# Patient Record
Sex: MALE | Race: WHITE
[De-identification: names, ages, dates, MRNs, and addresses within clinical notes are randomized per-mention and may not be internally consistent; named-entity substitution may affect disease eponyms.]

---

## 2018-04-21 ENCOUNTER — HOSPITAL ENCOUNTER (EMERGENCY)
Dept: HOSPITAL 92 - ERS | Age: 63
Discharge: LEFT BEFORE DISCHARGE | End: 2018-04-21
Payer: MEDICARE

## 2018-04-21 DIAGNOSIS — I11.0: ICD-10-CM

## 2018-04-21 DIAGNOSIS — R09.02: ICD-10-CM

## 2018-04-21 DIAGNOSIS — F31.9: ICD-10-CM

## 2018-04-21 DIAGNOSIS — N17.9: ICD-10-CM

## 2018-04-21 DIAGNOSIS — K74.60: ICD-10-CM

## 2018-04-21 DIAGNOSIS — E11.9: ICD-10-CM

## 2018-04-21 DIAGNOSIS — E78.5: ICD-10-CM

## 2018-04-21 DIAGNOSIS — I25.2: ICD-10-CM

## 2018-04-21 DIAGNOSIS — I50.9: ICD-10-CM

## 2018-04-21 DIAGNOSIS — F41.9: ICD-10-CM

## 2018-04-21 DIAGNOSIS — Z87.891: ICD-10-CM

## 2018-04-21 DIAGNOSIS — D64.9: ICD-10-CM

## 2018-04-21 DIAGNOSIS — F43.10: ICD-10-CM

## 2018-04-21 DIAGNOSIS — I21.4: Primary | ICD-10-CM

## 2018-04-21 LAB
ALBUMIN SERPL BCG-MCNC: 3.8 G/DL (ref 3.4–4.8)
ALP SERPL-CCNC: 68 U/L (ref 40–150)
ALT SERPL W P-5'-P-CCNC: 8 U/L (ref 8–55)
ANION GAP SERPL CALC-SCNC: 13 MMOL/L (ref 10–20)
AST SERPL-CCNC: 15 U/L (ref 5–34)
BILIRUB SERPL-MCNC: 0.5 MG/DL (ref 0.2–1.2)
BUN SERPL-MCNC: 44 MG/DL (ref 8.4–25.7)
CALCIUM SERPL-MCNC: 8.9 MG/DL (ref 7.8–10.44)
CHLORIDE SERPL-SCNC: 114 MMOL/L (ref 98–107)
CK MB SERPL-MCNC: 3 NG/ML (ref 0–6.6)
CK SERPL-CCNC: 46 U/L (ref 30–200)
CO2 SERPL-SCNC: 18 MMOL/L (ref 23–31)
CREAT CL PREDICTED SERPL C-G-VRATE: 0 ML/MIN (ref 70–130)
GLOBULIN SER CALC-MCNC: 3.1 G/DL (ref 2.4–3.5)
GLUCOSE SERPL-MCNC: 170 MG/DL (ref 80–115)
HGB BLD-MCNC: 8.1 G/DL (ref 14–18)
MCH RBC QN AUTO: 30.9 PG (ref 27–31)
MCV RBC AUTO: 95.4 FL (ref 80–94)
MDIFF COMPLETE?: YES
PLATELET # BLD AUTO: 38 THOU/UL (ref 130–400)
PLATELET BLD QL SMEAR: (no result)
POTASSIUM SERPL-SCNC: 3.9 MMOL/L (ref 3.5–5.1)
RBC # BLD AUTO: 2.61 MILL/UL (ref 4.7–6.1)
SODIUM SERPL-SCNC: 141 MMOL/L (ref 136–145)
TROPONIN I SERPL DL<=0.01 NG/ML-MCNC: 1.38 NG/ML (ref ?–0.03)
WBC # BLD AUTO: 2.9 THOU/UL (ref 4.8–10.8)

## 2018-04-21 PROCEDURE — 80053 COMPREHEN METABOLIC PANEL: CPT

## 2018-04-21 PROCEDURE — 84484 ASSAY OF TROPONIN QUANT: CPT

## 2018-04-21 PROCEDURE — 94760 N-INVAS EAR/PLS OXIMETRY 1: CPT

## 2018-04-21 PROCEDURE — 93005 ELECTROCARDIOGRAM TRACING: CPT

## 2018-04-21 PROCEDURE — 71045 X-RAY EXAM CHEST 1 VIEW: CPT

## 2018-04-21 PROCEDURE — 85025 COMPLETE CBC W/AUTO DIFF WBC: CPT

## 2018-04-21 PROCEDURE — 82553 CREATINE MB FRACTION: CPT

## 2018-04-21 NOTE — PDOC.FPRHP
- History of Present Illness


Chief Complaint: SOB


History of Present Illness: 





64 yo male presents with SOB.


ED Course: 





Rocephin, Azithromycin, 500 ml NS bolus, Aspirin





- Allergies/Adverse Reactions


 Allergies











Allergy/AdvReac Type Severity Reaction Status Date / Time


 


No Known Allergies Allergy   Verified 05/28/14 13:00














- Home Medications


 











 Medication  Instructions  Recorded  Confirmed  Type


 


Lisinopril [Prinivil] 10 mg PO DAILY 05/28/14 09/07/14 History


 


Aspirin 325 mg PO DAILY #0 tab 06/04/14 09/08/14 Rx


 


Clopidogrel Bisulfate [Plavix] 75 mg PO DAILY #0 tab 06/04/14 09/07/14 Rx


 


Acarbose [Precose] 50 mg PO TID-WM 06/07/14 09/07/14 History


 


Carvedilol [Coreg] 12.5 mg PO BID #0 tab 06/09/14 09/07/14 Rx


 


Furosemide [Lasix] 40 mg PO 0900,1400 #0 tab 06/09/14 09/07/14 Rx


 


HYDROcodone Bit/Acetaminophen 1 - 2 tab PO Q8HR PRN #0 tab 06/09/14 09/08/14 Rx





[Vicodin]    


 


Nitroglycerin [Nitrostat] 0.4 mg PO Q5MIN PRN #0 tab 06/09/14 09/07/14 Rx


 


Atorvastatin Calcium [Lipitor] 40 mg PO HS 09/07/14 09/07/14 History


 


traMADol HCl [Tramadol HCl] 50 mg PO TID PRN 09/07/14 09/07/14 History


 


HYDROcodone Bit/APAP 5/325 [Norco] 1 tab PO Q4H PRN #0 tab 09/21/14  Rx


 


Potassium Chloride [K-Dur] 20 meq PO QAM-WM #0 tab 09/21/14  Rx














- History


PMHx: DM2, HLD, HTN, Anemia, CHF, CKD, Cirrhosis of Liver


 


PSHx: Unsuccessful stent placement in 2014





FHx: Non- Contributory 


 


Social: Former Tobacco and Alcohol user. No illicit drug use. 


 








- Review of Systems


Respiratory: reports: shortness of breath





- Vital signs


BP: [134/69]  HR: [75] RR: [32] Tmax: [97.4] Pox: [97]% on [1L]  Wt: [89.4kg ] 

  








FMR H&P: Results





- Labs


Result Diagrams: 


 04/21/18 20:31





 04/21/18 20:31


Lab results: 


 











WBC  2.9 thou/uL (4.8-10.8)  L  04/21/18  20:31    


 


Hgb  8.1 g/dL (14.0-18.0)  L  04/21/18  20:31    


 


Hct  24.9 % (42.0-52.0)  L  04/21/18  20:31    


 


MCV  95.4 fl (80.0-94.0)  H  04/21/18  20:31    


 


Plt Count  38 thou/uL (130-400)  L  04/21/18  20:31    


 


Sodium  141 mmol/L (136-145)   04/21/18  20:31    


 


Potassium  3.9 mmol/L (3.5-5.1)   04/21/18  20:31    


 


Chloride  114 mmol/L ()  H  04/21/18  20:31    


 


Carbon Dioxide  18 mmol/L (23-31)  L  04/21/18  20:31    


 


BUN  44 mg/dL (8.4-25.7)  H  04/21/18  20:31    


 


Creatinine  1.78 mg/dL (0.6-1.3)  H  04/21/18  20:31    


 


Glucose  170 mg/dL ()  H  04/21/18  20:31    


 


Calcium  8.9 mg/dL (7.8-10.44)   04/21/18  20:31    


 


Total Bilirubin  0.5 mg/dL (0.2-1.2)   04/21/18  20:31    


 


AST  15 U/L (5-34)   04/21/18  20:31    


 


ALT  8 U/L (8-55)   04/21/18  20:31    


 


Alkaline Phosphatase  68 U/L ()   04/21/18  20:31    


 


Creatine Kinase  46 U/L ()   04/21/18  20:31    


 


CK-MB (CK-2)  3.0 ng/mL (0-6.6)   04/21/18  20:31    


 


Serum Total Protein  6.9 g/dL (5.8-8.1)   04/21/18  20:31    


 


Albumin  3.8 g/dL (3.4-4.8)   04/21/18  20:31    














FMR H&P: Upper Level





- Plan


Date/Time: 04/21/18 2238








I, [], have evaluated this patient and agree with findings/plan as outlined by 

intern resident. Pertinent changes/additions are listed here.

## 2018-04-21 NOTE — RAD
PORTABLE AP CHEST X-RAY:

4/21/18

 

HISTORY: 

Shortness of breath. 

 

COMPARISON:  

Prior study on 9/19/14.

 

FINDINGS:  

Right subclavian central venous catheter is no longer in place. Postsurgical change related to median
 sternotomy are noted. There is increased air space opacity seen at the right lung base worrisome for
 either aspiration pneumonitis or pneumonia. Left lung is clear. No other interval change. 

 

IMPRESSION:  

1.      Parenchymal air space opacity at the right lung base which may be related to either aspiratio
n pneumonitis or pneumonia. Followup to resolution is recommended. 

2.      Cardiomegaly.

 

POS: [facility]

## 2018-05-23 ENCOUNTER — HOSPITAL ENCOUNTER (EMERGENCY)
Dept: HOSPITAL 92 - ERS | Age: 63
Discharge: HOME | End: 2018-05-23
Payer: MEDICARE

## 2018-05-23 DIAGNOSIS — I25.2: ICD-10-CM

## 2018-05-23 DIAGNOSIS — E11.9: ICD-10-CM

## 2018-05-23 DIAGNOSIS — Z79.899: ICD-10-CM

## 2018-05-23 DIAGNOSIS — F31.9: ICD-10-CM

## 2018-05-23 DIAGNOSIS — F41.9: ICD-10-CM

## 2018-05-23 DIAGNOSIS — I11.0: ICD-10-CM

## 2018-05-23 DIAGNOSIS — I50.9: ICD-10-CM

## 2018-05-23 DIAGNOSIS — E78.5: ICD-10-CM

## 2018-05-23 DIAGNOSIS — Z87.891: ICD-10-CM

## 2018-05-23 DIAGNOSIS — L03.113: Primary | ICD-10-CM

## 2018-05-23 LAB
ALBUMIN SERPL BCG-MCNC: 3.8 G/DL (ref 3.4–4.8)
ALP SERPL-CCNC: 72 U/L (ref 40–150)
ALT SERPL W P-5'-P-CCNC: 8 U/L (ref 8–55)
ANION GAP SERPL CALC-SCNC: 15 MMOL/L (ref 10–20)
AST SERPL-CCNC: 20 U/L (ref 5–34)
BILIRUB SERPL-MCNC: 0.6 MG/DL (ref 0.2–1.2)
BUN SERPL-MCNC: 32 MG/DL (ref 8.4–25.7)
CALCIUM SERPL-MCNC: 8.7 MG/DL (ref 7.8–10.44)
CHLORIDE SERPL-SCNC: 108 MMOL/L (ref 98–107)
CK MB SERPL-MCNC: 1.3 NG/ML (ref 0–6.6)
CK SERPL-CCNC: 31 U/L (ref 30–200)
CO2 SERPL-SCNC: 20 MMOL/L (ref 23–31)
CREAT CL PREDICTED SERPL C-G-VRATE: 0 ML/MIN (ref 70–130)
GLOBULIN SER CALC-MCNC: 3.6 G/DL (ref 2.4–3.5)
GLUCOSE SERPL-MCNC: 135 MG/DL (ref 80–115)
HGB BLD-MCNC: 8.9 G/DL (ref 14–18)
MCH RBC QN AUTO: 30.6 PG (ref 27–31)
MCV RBC AUTO: 94.2 FL (ref 80–94)
MDIFF COMPLETE?: YES
PLATELET # BLD AUTO: 53 THOU/UL (ref 130–400)
PLATELET BLD QL SMEAR: (no result)
POTASSIUM SERPL-SCNC: 3.5 MMOL/L (ref 3.5–5.1)
RBC # BLD AUTO: 2.91 MILL/UL (ref 4.7–6.1)
SODIUM SERPL-SCNC: 139 MMOL/L (ref 136–145)
TROPONIN I SERPL DL<=0.01 NG/ML-MCNC: 0.05 NG/ML (ref ?–0.03)
WBC # BLD AUTO: 2.7 THOU/UL (ref 4.8–10.8)

## 2018-05-23 PROCEDURE — 82550 ASSAY OF CK (CPK): CPT

## 2018-05-23 PROCEDURE — 93005 ELECTROCARDIOGRAM TRACING: CPT

## 2018-05-23 PROCEDURE — 82553 CREATINE MB FRACTION: CPT

## 2018-05-23 PROCEDURE — 36415 COLL VENOUS BLD VENIPUNCTURE: CPT

## 2018-05-23 PROCEDURE — 80053 COMPREHEN METABOLIC PANEL: CPT

## 2018-05-23 PROCEDURE — 84484 ASSAY OF TROPONIN QUANT: CPT

## 2018-05-23 PROCEDURE — 85025 COMPLETE CBC W/AUTO DIFF WBC: CPT

## 2018-05-23 NOTE — ULT
RIGHT UPPER EXTREMITY VENOUS DOPPLER:

 

05/23/2018

 

PROVIDED CLINICAL HISTORY:

Right arm swelling.

 

FINDINGS:

Gray-scale and color Doppler sonography with spectral analysis was performed of the right internal ju
gular, subclavian, axillary, brachial, basilic, cephalic, radial, and ulnar veins, demonstrating a no
rmal sonographic appearance to each.

 

IMPRESSION:

No sonographic evidence for right upper extremity venous thrombosis.

 

POS: [facility]

## 2018-07-07 ENCOUNTER — HOSPITAL ENCOUNTER (EMERGENCY)
Dept: HOSPITAL 92 - ERS | Age: 63
Discharge: HOME | End: 2018-07-07
Payer: OTHER GOVERNMENT

## 2018-07-07 DIAGNOSIS — F43.10: ICD-10-CM

## 2018-07-07 DIAGNOSIS — E78.5: ICD-10-CM

## 2018-07-07 DIAGNOSIS — Z79.891: ICD-10-CM

## 2018-07-07 DIAGNOSIS — Z87.891: ICD-10-CM

## 2018-07-07 DIAGNOSIS — Z79.899: ICD-10-CM

## 2018-07-07 DIAGNOSIS — I11.0: Primary | ICD-10-CM

## 2018-07-07 DIAGNOSIS — F41.9: ICD-10-CM

## 2018-07-07 DIAGNOSIS — E11.9: ICD-10-CM

## 2018-07-07 DIAGNOSIS — F31.9: ICD-10-CM

## 2018-07-07 DIAGNOSIS — I25.2: ICD-10-CM

## 2018-07-07 DIAGNOSIS — I50.9: ICD-10-CM

## 2018-07-07 DIAGNOSIS — D64.9: ICD-10-CM

## 2018-07-07 LAB
ALBUMIN SERPL BCG-MCNC: 3.6 G/DL (ref 3.4–4.8)
ALP SERPL-CCNC: 76 U/L (ref 40–150)
ALT SERPL W P-5'-P-CCNC: 12 U/L (ref 8–55)
ANION GAP SERPL CALC-SCNC: 13 MMOL/L (ref 10–20)
AST SERPL-CCNC: 19 U/L (ref 5–34)
BILIRUB SERPL-MCNC: 0.5 MG/DL (ref 0.2–1.2)
BUN SERPL-MCNC: 39 MG/DL (ref 8.4–25.7)
CALCIUM SERPL-MCNC: 8.3 MG/DL (ref 7.8–10.44)
CHLORIDE SERPL-SCNC: 111 MMOL/L (ref 98–107)
CK MB SERPL-MCNC: 3.4 NG/ML (ref 0–6.6)
CO2 SERPL-SCNC: 20 MMOL/L (ref 23–31)
CREAT CL PREDICTED SERPL C-G-VRATE: 0 ML/MIN (ref 70–130)
GLOBULIN SER CALC-MCNC: 2.9 G/DL (ref 2.4–3.5)
GLUCOSE SERPL-MCNC: 140 MG/DL (ref 80–115)
HGB BLD-MCNC: 7.9 G/DL (ref 14–18)
MCH RBC QN AUTO: 31.6 PG (ref 27–31)
MCV RBC AUTO: 96.1 FL (ref 78–98)
MDIFF COMPLETE?: YES
PLATELET # BLD AUTO: 35 THOU/UL (ref 130–400)
PLATELET BLD QL SMEAR: (no result)
POTASSIUM SERPL-SCNC: 4.5 MMOL/L (ref 3.5–5.1)
RBC # BLD AUTO: 2.51 MILL/UL (ref 4.7–6.1)
SODIUM SERPL-SCNC: 139 MMOL/L (ref 136–145)
TROPONIN I SERPL DL<=0.01 NG/ML-MCNC: 0.09 NG/ML (ref ?–0.03)
WBC # BLD AUTO: 2.6 THOU/UL (ref 4.8–10.8)

## 2018-07-07 PROCEDURE — 82553 CREATINE MB FRACTION: CPT

## 2018-07-07 PROCEDURE — 80053 COMPREHEN METABOLIC PANEL: CPT

## 2018-07-07 PROCEDURE — 93005 ELECTROCARDIOGRAM TRACING: CPT

## 2018-07-07 PROCEDURE — 85025 COMPLETE CBC W/AUTO DIFF WBC: CPT

## 2018-07-07 PROCEDURE — 96374 THER/PROPH/DIAG INJ IV PUSH: CPT

## 2018-07-07 PROCEDURE — 36415 COLL VENOUS BLD VENIPUNCTURE: CPT

## 2018-07-07 PROCEDURE — 84484 ASSAY OF TROPONIN QUANT: CPT

## 2018-07-07 PROCEDURE — 71045 X-RAY EXAM CHEST 1 VIEW: CPT

## 2018-07-07 PROCEDURE — 83880 ASSAY OF NATRIURETIC PEPTIDE: CPT

## 2018-07-07 NOTE — RAD
FRONTAL VIEW CHEST:

 

COMPARISON: 

4/21/18.

 

INDICATION: 

Short of breath.

 

FINDINGS: 

Progressive pleural and parenchymal density at the mid to inferior right chest is present.  The left 
lung is relatively clear.  Cardiac silhouette and pulmonary vasculature are prominent with evidence o
f prior sternotomy.

 

IMPRESSION: 

Progressive pleural and parenchymal density of the mid inferior right hemithorax.  This may relate to
 pneumonia with associated pleural effusion.  Recommend continued imaging followup with dedicated 2-v
iew chest for further assessment.

 

POS: [facility]

## 2018-07-23 ENCOUNTER — HOSPITAL ENCOUNTER (INPATIENT)
Dept: HOSPITAL 92 - ERS | Age: 63
LOS: 2 days | Discharge: LEFT BEFORE DISCHARGE | DRG: 189 | End: 2018-07-25
Attending: HOSPITALIST | Admitting: HOSPITALIST
Payer: OTHER GOVERNMENT

## 2018-07-23 VITALS — BODY MASS INDEX: 25.2 KG/M2

## 2018-07-23 DIAGNOSIS — E11.22: ICD-10-CM

## 2018-07-23 DIAGNOSIS — I12.9: ICD-10-CM

## 2018-07-23 DIAGNOSIS — N18.3: ICD-10-CM

## 2018-07-23 DIAGNOSIS — J90: ICD-10-CM

## 2018-07-23 DIAGNOSIS — Z79.02: ICD-10-CM

## 2018-07-23 DIAGNOSIS — Z79.82: ICD-10-CM

## 2018-07-23 DIAGNOSIS — I25.10: ICD-10-CM

## 2018-07-23 DIAGNOSIS — Z82.49: ICD-10-CM

## 2018-07-23 DIAGNOSIS — Z87.891: ICD-10-CM

## 2018-07-23 DIAGNOSIS — Z79.4: ICD-10-CM

## 2018-07-23 DIAGNOSIS — J96.01: Primary | ICD-10-CM

## 2018-07-23 DIAGNOSIS — K74.60: ICD-10-CM

## 2018-07-23 DIAGNOSIS — D61.818: ICD-10-CM

## 2018-07-23 DIAGNOSIS — Z95.1: ICD-10-CM

## 2018-07-23 DIAGNOSIS — I42.9: ICD-10-CM

## 2018-07-23 DIAGNOSIS — R18.8: ICD-10-CM

## 2018-07-23 PROCEDURE — 93306 TTE W/DOPPLER COMPLETE: CPT

## 2018-07-23 PROCEDURE — 85610 PROTHROMBIN TIME: CPT

## 2018-07-23 PROCEDURE — 71045 X-RAY EXAM CHEST 1 VIEW: CPT

## 2018-07-23 PROCEDURE — 36415 COLL VENOUS BLD VENIPUNCTURE: CPT

## 2018-07-23 PROCEDURE — 86850 RBC ANTIBODY SCREEN: CPT

## 2018-07-23 PROCEDURE — 80076 HEPATIC FUNCTION PANEL: CPT

## 2018-07-23 PROCEDURE — 80053 COMPREHEN METABOLIC PANEL: CPT

## 2018-07-23 PROCEDURE — 36416 COLLJ CAPILLARY BLOOD SPEC: CPT

## 2018-07-23 PROCEDURE — 83880 ASSAY OF NATRIURETIC PEPTIDE: CPT

## 2018-07-23 PROCEDURE — 86900 BLOOD TYPING SEROLOGIC ABO: CPT

## 2018-07-23 PROCEDURE — 85025 COMPLETE CBC W/AUTO DIFF WBC: CPT

## 2018-07-23 PROCEDURE — 82553 CREATINE MB FRACTION: CPT

## 2018-07-23 PROCEDURE — 84484 ASSAY OF TROPONIN QUANT: CPT

## 2018-07-23 PROCEDURE — 81003 URINALYSIS AUTO W/O SCOPE: CPT

## 2018-07-23 PROCEDURE — 82140 ASSAY OF AMMONIA: CPT

## 2018-07-23 PROCEDURE — 80048 BASIC METABOLIC PNL TOTAL CA: CPT

## 2018-07-23 PROCEDURE — 96374 THER/PROPH/DIAG INJ IV PUSH: CPT

## 2018-07-23 PROCEDURE — 86901 BLOOD TYPING SEROLOGIC RH(D): CPT

## 2018-07-24 LAB
ALBUMIN SERPL BCG-MCNC: 3.7 G/DL (ref 3.4–4.8)
ALP SERPL-CCNC: 64 U/L (ref 40–150)
ALT SERPL W P-5'-P-CCNC: (no result) U/L (ref 8–55)
ANION GAP SERPL CALC-SCNC: 14 MMOL/L (ref 10–20)
ANISOCYTOSIS BLD QL SMEAR: (no result) (100X)
AST SERPL-CCNC: 12 U/L (ref 5–34)
BILIRUB SERPL-MCNC: 0.8 MG/DL (ref 0.2–1.2)
BUN SERPL-MCNC: 53 MG/DL (ref 8.4–25.7)
CALCIUM SERPL-MCNC: 8.6 MG/DL (ref 7.8–10.44)
CHLORIDE SERPL-SCNC: 111 MMOL/L (ref 98–107)
CK MB SERPL-MCNC: 1.5 NG/ML (ref 0–6.6)
CO2 SERPL-SCNC: 21 MMOL/L (ref 23–31)
CREAT CL PREDICTED SERPL C-G-VRATE: 0 ML/MIN (ref 70–130)
GLOBULIN SER CALC-MCNC: 3.1 G/DL (ref 2.4–3.5)
GLUCOSE SERPL-MCNC: 126 MG/DL (ref 80–115)
HGB BLD-MCNC: 8 G/DL (ref 14–18)
INR PPP: 1.4
MCH RBC QN AUTO: 31.6 PG (ref 27–31)
MCV RBC AUTO: 96.3 FL (ref 78–98)
MDIFF COMPLETE?: YES
OVALOCYTES BLD QL SMEAR: (no result) (100X)
PLATELET # BLD AUTO: 33 THOU/UL (ref 130–400)
PLATELET BLD QL SMEAR: (no result)
POTASSIUM SERPL-SCNC: 4 MMOL/L (ref 3.5–5.1)
PROTHROMBIN TIME: 17.6 SEC (ref 12–14.7)
RBC # BLD AUTO: 2.53 MILL/UL (ref 4.7–6.1)
SODIUM SERPL-SCNC: 142 MMOL/L (ref 136–145)
SP GR UR STRIP: 1.01 (ref 1–1.04)
TROPONIN I SERPL DL<=0.01 NG/ML-MCNC: 0.03 NG/ML (ref ?–0.03)
WBC # BLD AUTO: 2.3 THOU/UL (ref 4.8–10.8)

## 2018-07-24 NOTE — HP
PRIMARY CARE PROVIDER:  Dr. Heard at [facility].

 

HISTORY OF PRESENT ILLNESS:  Patient referred to the ER by Dr. Heard for shortness of breath.  He 
has long history of ascites, heart disease.  He has abdominal swelling.  He gets paracenteses every 3
-4 months, he has had no chest pain, his shortness of breath is at rest, worse with exertion, worse w
ith lying flat.

 

PAST MEDICAL HISTORY:  Pertinent for cirrhosis, cardiomyopathy, coronary artery disease post-coronary
 artery bypass graft in , hypertension, diabetes mellitus type 2, insulin-dependent.

 

CURRENT MEDICATIONS:  Levemir 30 units in the morning and 25 in the evening, Lasix 40 mg twice a day,
 Plavix 75 mg a day, Lipitor 40 mg a day, aspirin 81 mg a day, tramadol 50 mg t.i.d. p.r.n., K-Dur 20
 mEq a.m., lisinopril 10 mg a day, Coreg 12.5 mg twice a day.

 

ALLERGIES:  No known drug allergies.

 

PAST SURGICAL HISTORY:  He has had bilateral cataract surgery in addition to his coronary artery bypa
ss graft.

 

FAMILY HISTORY:  Negative cirrhosis.  His mother  of heart disease at 94.

 

SOCIAL HISTORY:  , FULL CODE status.  Wife is surrogate decision maker.  No alcohol in 2 years
.  No tobacco.

 

REVIEW OF SYSTEMS:  GENERAL:  The patient has dizziness arising, no fainting.  EYES:  No double visio
n, blurred vision, flashing lights.  EAR, NOSE, AND THROAT:  No ear pain or drainage.  No nasal bleed
ing.  No trouble swallowing.  CARDIAC:  No orthopnea, paroxysmal nocturnal dyspnea or chest pain exce
pt for some recent trouble with sleeping, lying flat.  RESPIRATIONS:  Dyspnea on exertion, chronic co
ugh, no asthma.  GASTROINTESTINAL:  No nausea or vomiting.  He does have loose stools with lactulose.
  No abdominal pain.  Does have abdominal swelling.  MUSCULOSKELETAL:  He has swelling in his lower e
xtremities.  No pain in his muscles or joints.  NEUROLOGICAL:  He thinks he had a stroke in 2017.  No
 residual.  PSYCHIATRIC:  No anxiety, depression.  SKIN:  No bruises, bleeding or rash.  HEME/LYMPH: 
 No tender or swollen lymph nodes in axilla, inguinal, or cervical area.

 

PHYSICAL EXAMINATION:

GENERAL APPEARANCE:  Patient is alert, oriented, cooperative, pleasant.

VITAL SIGNS:  O2 sat 94% on 2 liters by nasal cannula, blood pressure 113/57, pulse 77, respirations 
20.

HEAD, EYES, EARS, NOSE AND THROAT:  Reveal pupils equal, round, and reactive to light.  Extraocular m
ovements are intact.  Sclerae are white.  Tympanic membranes clear.  Nose is clear.  Oral mucous memb
ranes are wet with no lesions.

NECK:  No jugular venous distention, adenopathy or thyromegaly.

CHEST:  Dull to percussion in the right lower chest.  Breath sounds are good otherwise.

HEART:  Regular rate and rhythm.  First and second heart sounds are clear.  No appreciated murmurs or
 gallops.

ABDOMEN:  Distended, positive fluid wave, nontender.  It is difficult to be sure because of the ascit
es, but the spleen appears palpable.

EXTREMITIES:  Revealed 2+ edema with no cyanosis or clubbing.

PULSES:  Carotid, radial, femoral, and dorsalis pedis pulses intact.

SKIN:  Warm and dry without bruises or rash.

PSYCHIATRIC:  No tender or swollen lymph nodes in axilla, inguinal, or cervical area.

NEUROLOGIC:  Cranial nerves II-XII are intact.  Moves all extremities.  Deep tendon reflexes symmetri
c.

 

LABORATORY DATA AND IMAGING DATA:  CBC:  White count 2.3, hemoglobin 8.0, platelet count 33,000.  Com
p metabolic profile and liver function test remarkably normal.  Blood sugar 126, creatinine 1.87, BUN
 5.3, chloride 111 and CO2 of 21.  BNP is elevated at 1860.  Troponin 0.03.  Urine is clear.  Chest x
-ray, post-surgical changes, moderate right pleural effusion, reviewed by me.  EKG, none presented.

 

ADMITTING DIAGNOSES:

1.  Acute respiratory failure with hypoxemia.

2.  Right pleural effusion.

3.  Cirrhosis.

4.  Ascites.

5.  Coronary artery disease.

6.  Cardiomyopathy.

7.  Diabetes mellitus type 2, insulin-dependent with chronic kidney disease stage 3.

8.  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

 

PLAN:  We will place him in the hospital.  Echocardiogram has been ordered.  Prothrombin time has bee
n ordered.  Patient has been started on rapid diuresis in the emergency room.  We will slow that down
.  We will consult GI.  We will make further recommendations when more data is obtained.  He is on O2
 2 liters by nasal cannula.

## 2018-07-24 NOTE — RAD
CHEST ONE VIEW:

 

HISTORY:

Fluid overload.

 

COMPARISON:

07/07/2018

 

FINDINGS:

Persistent opacification of the right hemithorax due to pleural and parenchymal changes.  When compar
ed to the previous study, the degree of opacification has decreased.  Stable aeration of the left august
g.  Stable configuration of the enlarged cardiac silhouette.  No pneumothorax.

 

IMPRESSION:

Persistent opacification of the right hemithorax due to pleural and parenchymal changes.  The degree 
of opacification has decreased since the prior examination.

 

POS: [facility]

## 2018-07-25 VITALS — SYSTOLIC BLOOD PRESSURE: 111 MMHG | DIASTOLIC BLOOD PRESSURE: 62 MMHG | TEMPERATURE: 97.5 F

## 2018-07-25 LAB
ALBUMIN SERPL BCG-MCNC: 3.5 G/DL (ref 3.4–4.8)
ALP SERPL-CCNC: 55 U/L (ref 40–150)
ALT SERPL W P-5'-P-CCNC: (no result) U/L (ref 8–55)
ANION GAP SERPL CALC-SCNC: 13 MMOL/L (ref 10–20)
AST SERPL-CCNC: 11 U/L (ref 5–34)
BILIRUB DIRECT SERPL-MCNC: 0.3 MG/DL (ref 0.1–0.3)
BILIRUB SERPL-MCNC: 0.8 MG/DL (ref 0.2–1.2)
BUN SERPL-MCNC: 51 MG/DL (ref 8.4–25.7)
CALCIUM SERPL-MCNC: 8.4 MG/DL (ref 7.8–10.44)
CHLORIDE SERPL-SCNC: 110 MMOL/L (ref 98–107)
CO2 SERPL-SCNC: 22 MMOL/L (ref 23–31)
CREAT CL PREDICTED SERPL C-G-VRATE: 53 ML/MIN (ref 70–130)
GLUCOSE SERPL-MCNC: 74 MG/DL (ref 80–115)
HGB BLD-MCNC: 7.1 G/DL (ref 14–18)
INR PPP: 1.4
MCH RBC QN AUTO: 30.9 PG (ref 27–31)
MCV RBC AUTO: 96.4 FL (ref 78–98)
MDIFF COMPLETE?: YES
PLATELET # BLD AUTO: 29 THOU/UL (ref 130–400)
PLATELET BLD QL SMEAR: (no result)
POTASSIUM SERPL-SCNC: 3.8 MMOL/L (ref 3.5–5.1)
PROTHROMBIN TIME: 17.5 SEC (ref 12–14.7)
RBC # BLD AUTO: 2.29 MILL/UL (ref 4.7–6.1)
SODIUM SERPL-SCNC: 141 MMOL/L (ref 136–145)
WBC # BLD AUTO: 1.9 THOU/UL (ref 4.8–10.8)

## 2018-07-25 NOTE — DIS
DATE OF ADMISSION:  07/24/2018

 

LEFT AGAINST MEDICAL ADVICE:  07/25/2018

 

PRIMARY CARE PROVIDER:  José Miguel Heard

 

DISCHARGE MEDICATIONS:  None.

 

ALLERGIES:  No known drug allergies.

 

HOSPITAL COURSE:  The patient [facility], s
hortness of breath.  The patient had cirrhosis, cardiomyopathy, coronary artery disease, hypertension
, diabetes mellitus type 2, insulin-dependent.

 

On physical exam, he had significant ascites.  No other significant findings of note.  Chest x-ray di
d reveal a right pleural effusion.  Laboratory revealed a pancytopenia with a hemoglobin of 8.0, plat
elet count 33,000 and a white count of 2.3.  His INR was 1.4.  His comp metabolic profile revealed a 
creatinine of 1.87, BUN 53.  Blood sugars in the 100 range.  Ammonia 44.  He was seen in consultation
 by Dr. Hung Deutsch who recommended a thoracentesis, to stop his Plavix and give him platelets befo
rehand.  This morning I was called, told that he had disappeared, that he did not want to fool around
 with us and was [facility].  He was discharged AMA.

## 2018-07-25 NOTE — CON
DATE OF CONSULTATION:  07/24/2018

 

CHIEF COMPLAINT:  Shortness of breath and abdominal distention.

 

HISTORY OF PRESENT ILLNESS:  Mr. Venegas is a 63-year-old man with cirrhosis and cardiomyopathy who 
has been followed at the [facility] who presented to the emergency room yesterday with progressive abd
ominal distention and shortness of breath.  He had a paracentesis around 3 weeks ago and he had 5 bot
tles of fluid removed.  He has no abdominal pain now.  No diarrhea, constipation, or blood in the sto
ol.

 

PAST MEDICAL HISTORY:  Cirrhosis of liver secondary to past alcohol use and hepatitis C.  He quit dri
nking alcohol 2 years ago.  He was treated for a period of the hepatitis C with oral medications last
 year.  There is a history of coronary artery disease status post coronary artery bypass graft and he
 has a history of cardiomyopathy.  He has hypertension and diabetes.

 

PAST SURGICAL HISTORY:  Coronary artery bypass graft, cataract surgery.

 

FAMILY HISTORY:  Negative for GI malignancies.

 

SOCIAL HISTORY:  Quit alcohol 2 years ago.  He quit smoking 2 years ago.  No drugs.  He is a [place] 
.

 

ALLERGIES:  No known drug allergies.

 

MEDICATIONS:  Prior to admission insulin, Lasix 40 mg twice daily, Lipitor, aspirin, tramadol, lisino
pril, Coreg.

 

REVIEW OF SYSTEMS:  Negative x10 systems reviewed except as stated in the history of present illness.


 

PHYSICAL EXAMINATION:

VITAL SIGNS:  Temperature 97.4, blood pressure 109/58, pulse 69, oxygen saturation 98% on room air.

GENERAL:  He is somewhat tachypneic.

HEENT:  His eyes have no scleral icterus.  Oropharynx is clear without lesions.

NECK:  No cervical or supraclavicular lymphadenopathy.

LUNGS:  Clear to auscultation bilaterally.

HEART:  Regular rate and rhythm.

ABDOMEN:  Distended, but soft.  His bowel sounds are present.

EXTREMITIES:  2+ pitting lower extremity edema.

 

LABORATORY DATA:  White blood cell count 2.3, hemoglobin 8.0, platelets 33,000.  INR 1.4.  Creatinine
 1.87 with a BUN of 53.  Ammonia 44, bilirubin 0.8, AST 12, ALT 7, alkaline phosphatase 64, albumin 3
.7.

 

IMPRESSION:

1.  Decompensated Cirrhosis secondary to past alcohol and past hepatitis C infection.  He quit drinki
ng 2 years ago.  He was cured of the hepatitis C with oral medicines per his report last year.  He is
 decompensated with ascites; however, his liver synthetic function appears good with normal bilirubin
 and normal albumin.  His INR is elevated at 1.4.

2.  Ascites.  He has required repeated paracentesis.  He is on furosemide; however, I do not see spir
onolactone on his medication list.  He should be on low salt diet.  He becomes more short of breath a
s the ascites worsens.  Chest x-ray shows opacification of the right hemithorax, which may be due to 
hepatic hydrothorax some degree.

3.  Cardiomyopathy.  This may be contributing significantly to the ascites and lower extremity edema.
  [facility]  I do not know what the status of varices scr
eening or hepatoma screening.  We will request records.

 

RECOMMENDATIONS:

1.  Low salt diet.

2.  Continue current diuretics for now.  He does have elevated creatinine and have to be careful with
 the diuretics.  Again, we will request records regarding this.

3.  If he has not had ultrasound of the liver or alpha fetoprotein in the last 6 months, this can be 
checked.  We will request previous labs and endoscopy reports.

4.  We will recommend paracentesis with radiology guidance tomorrow.  He does have thrombocytopenia r
elated to hypersplenism.  Given that his platelet count is 33, 000 it might be reasonable to disconti
nue the Plavix at this point and we will give platelets prior to the paracentesis tomorrow as radiolo
gy will require this.  The ascitic fluid should be sent for albumin and total protein as well as cult
ure and sensitivity.  We will compare the serum albumin tomorrow to calculate a serum ascites albumin
 gradient.

## 2018-10-08 ENCOUNTER — HOSPITAL ENCOUNTER (INPATIENT)
Dept: HOSPITAL 92 - ERS | Age: 63
LOS: 1 days | Discharge: LEFT BEFORE DISCHARGE | DRG: 433 | End: 2018-10-09
Attending: FAMILY MEDICINE | Admitting: FAMILY MEDICINE
Payer: MEDICARE

## 2018-10-08 VITALS — BODY MASS INDEX: 25.7 KG/M2

## 2018-10-08 DIAGNOSIS — E11.22: ICD-10-CM

## 2018-10-08 DIAGNOSIS — I50.22: ICD-10-CM

## 2018-10-08 DIAGNOSIS — N18.3: ICD-10-CM

## 2018-10-08 DIAGNOSIS — I13.0: ICD-10-CM

## 2018-10-08 DIAGNOSIS — K70.31: Primary | ICD-10-CM

## 2018-10-08 DIAGNOSIS — R06.03: ICD-10-CM

## 2018-10-08 DIAGNOSIS — I25.10: ICD-10-CM

## 2018-10-08 DIAGNOSIS — D61.818: ICD-10-CM

## 2018-10-08 DIAGNOSIS — Z95.1: ICD-10-CM

## 2018-10-08 DIAGNOSIS — F10.20: ICD-10-CM

## 2018-10-08 DIAGNOSIS — Z79.4: ICD-10-CM

## 2018-10-08 LAB
ALBUMIN SERPL BCG-MCNC: 3.3 G/DL (ref 3.4–4.8)
ALP SERPL-CCNC: 70 U/L (ref 40–150)
ALT SERPL W P-5'-P-CCNC: (no result) U/L (ref 8–55)
ANION GAP SERPL CALC-SCNC: 12 MMOL/L (ref 10–20)
AST SERPL-CCNC: 12 U/L (ref 5–34)
BILIRUB SERPL-MCNC: 0.8 MG/DL (ref 0.2–1.2)
BUN SERPL-MCNC: 35 MG/DL (ref 8.4–25.7)
CALCIUM SERPL-MCNC: 8.1 MG/DL (ref 7.8–10.44)
CHLORIDE SERPL-SCNC: 110 MMOL/L (ref 98–107)
CK MB SERPL-MCNC: 1 NG/ML (ref 0–6.6)
CO2 SERPL-SCNC: 20 MMOL/L (ref 23–31)
CREAT CL PREDICTED SERPL C-G-VRATE: 0 ML/MIN (ref 70–130)
GLOBULIN SER CALC-MCNC: 2.6 G/DL (ref 2.4–3.5)
GLUCOSE SERPL-MCNC: 116 MG/DL (ref 80–115)
HGB BLD-MCNC: 7.6 G/DL (ref 14–18)
INR PPP: 1.4
MCH RBC QN AUTO: 30.1 PG (ref 27–31)
MCV RBC AUTO: 95.3 FL (ref 78–98)
MDIFF COMPLETE?: YES
PLATELET # BLD AUTO: 33 THOU/UL (ref 130–400)
PLATELET BLD QL SMEAR: (no result)
POLYCHROMASIA BLD QL SMEAR: (no result) (100X)
POTASSIUM SERPL-SCNC: 3.7 MMOL/L (ref 3.5–5.1)
PROTHROMBIN TIME: 17.4 SEC (ref 12–14.7)
RBC # BLD AUTO: 2.52 MILL/UL (ref 4.7–6.1)
SODIUM SERPL-SCNC: 138 MMOL/L (ref 136–145)
TROPONIN I SERPL DL<=0.01 NG/ML-MCNC: 0.03 NG/ML (ref ?–0.03)
WBC # BLD AUTO: 2.4 THOU/UL (ref 4.8–10.8)

## 2018-10-08 PROCEDURE — 86901 BLOOD TYPING SEROLOGIC RH(D): CPT

## 2018-10-08 PROCEDURE — 85025 COMPLETE CBC W/AUTO DIFF WBC: CPT

## 2018-10-08 PROCEDURE — 96375 TX/PRO/DX INJ NEW DRUG ADDON: CPT

## 2018-10-08 PROCEDURE — 87040 BLOOD CULTURE FOR BACTERIA: CPT

## 2018-10-08 PROCEDURE — 96365 THER/PROPH/DIAG IV INF INIT: CPT

## 2018-10-08 PROCEDURE — 96376 TX/PRO/DX INJ SAME DRUG ADON: CPT

## 2018-10-08 PROCEDURE — 83605 ASSAY OF LACTIC ACID: CPT

## 2018-10-08 PROCEDURE — 86900 BLOOD TYPING SEROLOGIC ABO: CPT

## 2018-10-08 PROCEDURE — 81003 URINALYSIS AUTO W/O SCOPE: CPT

## 2018-10-08 PROCEDURE — 36415 COLL VENOUS BLD VENIPUNCTURE: CPT

## 2018-10-08 PROCEDURE — 81015 MICROSCOPIC EXAM OF URINE: CPT

## 2018-10-08 PROCEDURE — 80053 COMPREHEN METABOLIC PANEL: CPT

## 2018-10-08 PROCEDURE — 87804 INFLUENZA ASSAY W/OPTIC: CPT

## 2018-10-08 PROCEDURE — 85610 PROTHROMBIN TIME: CPT

## 2018-10-08 PROCEDURE — 83880 ASSAY OF NATRIURETIC PEPTIDE: CPT

## 2018-10-08 PROCEDURE — 85379 FIBRIN DEGRADATION QUANT: CPT

## 2018-10-08 PROCEDURE — 84484 ASSAY OF TROPONIN QUANT: CPT

## 2018-10-08 PROCEDURE — 86850 RBC ANTIBODY SCREEN: CPT

## 2018-10-08 PROCEDURE — 82553 CREATINE MB FRACTION: CPT

## 2018-10-08 PROCEDURE — 96367 TX/PROPH/DG ADDL SEQ IV INF: CPT

## 2018-10-08 PROCEDURE — 71045 X-RAY EXAM CHEST 1 VIEW: CPT

## 2018-10-08 PROCEDURE — 82803 BLOOD GASES ANY COMBINATION: CPT

## 2018-10-08 PROCEDURE — 80048 BASIC METABOLIC PNL TOTAL CA: CPT

## 2018-10-08 PROCEDURE — 71275 CT ANGIOGRAPHY CHEST: CPT

## 2018-10-08 PROCEDURE — 84145 PROCALCITONIN (PCT): CPT

## 2018-10-08 PROCEDURE — 82330 ASSAY OF CALCIUM: CPT

## 2018-10-08 PROCEDURE — A4216 STERILE WATER/SALINE, 10 ML: HCPCS

## 2018-10-08 PROCEDURE — 36416 COLLJ CAPILLARY BLOOD SPEC: CPT

## 2018-10-08 PROCEDURE — 93005 ELECTROCARDIOGRAM TRACING: CPT

## 2018-10-08 NOTE — CT
CT PULMONARY ANGIOGRAM WITH IV CONTRAST AND 3D POST PROCESSING:

 

FINDINGS:

No filling defects are seen in the pulmonary arterial vasculature to suggest pulmonary embolism.  The
 thoracic aorta is well opacified without aneurysm or dissection.  No pericardial effusion is seen.  
There is a tiny left pleural effusion and moderate right pleural effusion with adjacent consolidation
/atelectatic changes.  No pneumothoraces are seen.  Upper abdominal tomograms demonstrate ascites, ci
rrhosis of the liver, and splenomegaly.  There are degenerative changes of the spine.

 

IMPRESSION:

No CT evidence of pulmonary embolism.

 

POS: [facility]

## 2018-10-08 NOTE — RAD
PORTABLE CHEST ONE VIEW:

 

10/08/2018

 

9:36 p.m.

 

COMPARISON:

07/23/2018

 

FINDINGS:

Changes of median sternotomy are again seen.  The heart is enlarged.  Pleural parenchymal changes wit
h opacification of the right lower hemithorax are again seen.  No pneumothoraces are identified.

 

POS: [facility]

## 2018-10-09 VITALS — DIASTOLIC BLOOD PRESSURE: 61 MMHG | TEMPERATURE: 96 F | SYSTOLIC BLOOD PRESSURE: 130 MMHG

## 2018-10-09 LAB
ANION GAP SERPL CALC-SCNC: 10 MMOL/L (ref 10–20)
BICARBONATE (HCO3V): 18.8 MMOL/L (ref 1–85)
BUN SERPL-MCNC: 33 MG/DL (ref 8.4–25.7)
CA-I BLD-SCNC: 1.06 MMOL/L (ref 1.12–1.32)
CALCIUM SERPL-MCNC: 7.8 MG/DL (ref 7.8–10.44)
CHLORIDE SERPL-SCNC: 108 MMOL/L (ref 98–113)
CHLORIDE SERPL-SCNC: 111 MMOL/L (ref 98–107)
CO2 BLDV CALC-SCNC: 19.7 MMOL/L (ref 1–85)
CO2 SERPL-SCNC: 19 MMOL/L (ref 23–31)
CO2 TENSION (PVCO2): 28.3 MMHG (ref 41–51)
CREAT CL PREDICTED SERPL C-G-VRATE: 55 ML/MIN (ref 70–130)
CRYSTAL-AUWI FLAG: 0 (ref 0–15)
GLUCOSE SERPL-MCNC: 134 MG/DL (ref 80–115)
HCT VFR BLD CALC: 22 % (ref 36–52)
HEMOGLOBIN - CALC: 7.6 G/DL (ref 12–18)
HEV IGM SER QL: 1.5 (ref 0–7.99)
HGB BLD-MCNC: 6.9 G/DL (ref 14–18)
HYALINE CASTS #/AREA URNS LPF: (no result) LPF
MCH RBC QN AUTO: 31.3 PG (ref 27–31)
MCV RBC AUTO: 95.5 FL (ref 78–98)
MDIFF COMPLETE?: YES
O2 TENSION (PVO2): 75.2 MMHG (ref 35–45)
PATHC CAST-AUWI FLAG: 0.29 (ref 0–2.49)
PLATELET # BLD AUTO: 24 THOU/UL (ref 130–400)
PLATELET BLD QL SMEAR: (no result)
POLYCHROMASIA BLD QL SMEAR: (no result) (100X)
POTASSIUM SERPL-SCNC: 3.4 MMOL/L (ref 3.5–5.1)
POTASSIUM SERPL-SCNC: 3.6 MMOL/L (ref 3.4–4.7)
RBC # BLD AUTO: 2.2 MILL/UL (ref 4.7–6.1)
RBC UR QL AUTO: (no result) HPF (ref 0–3)
SAO2 % BLDV FROM PO2: 95.6 % (ref 94–98)
SODIUM SERPL-SCNC: 137 MMOL/L (ref 136–145)
SODIUM SERPL-SCNC: 141 MMOL/L (ref 138–145)
SP GR UR STRIP: 1.01 (ref 1–1.04)
SPERM-AUWI FLAG: 0 (ref 0–9.9)
WBC # BLD AUTO: 2.1 THOU/UL (ref 4.8–10.8)
WBC UR QL AUTO: (no result) HPF (ref 0–3)
YEAST-AUWI FLAG: 0 (ref 0–25)

## 2018-10-09 NOTE — PDOC.FPRHP
- History of Present Illness


Chief Complaint: SOB


History of Present Illness: 





This is a 62 yo male with PMH of CAD s/p CABG, cirrhosis, HTN, IDDM, HFrEF who 

presents to the ER with a cc of SOB. He states this acute episode started 2 

days ago and worsened PTA. He denies any cough or chest pain with this episode. 

He reports he took 80mg of his lasix this morning and that did not help his 

breathing. He reports normally he [facility] 

[facility] [place] [facility] his [facility] and usually has increasing SOB 

prior to having the fluid removed. 


ED Course: 





20 IV lasix, vanc, zosyn, azithromycin





- Allergies/Adverse Reactions


 Allergies











Allergy/AdvReac Type Severity Reaction Status Date / Time


 


No Known Allergies Allergy   Verified 10/09/18 02:55














- Home Medications


 











 Medication  Instructions  Recorded  Confirmed  Type


 


Lisinopril [Prinivil] 10 mg PO DAILY 05/28/14 10/09/18 History


 


Clopidogrel Bisulfate [Plavix] 75 mg PO DAILY #0 tab 06/04/14 10/09/18 Rx


 


Carvedilol [Coreg] 12.5 mg PO BID #0 tab 06/09/14 10/09/18 Rx


 


Furosemide [Lasix] 40 mg PO 0900,1400 #0 tab 06/09/14 10/09/18 Rx


 


Nitroglycerin [Nitrostat] 0.4 mg PO Q5MIN PRN #0 tab 06/09/14 10/09/18 Rx


 


Atorvastatin Calcium [Lipitor] 40 mg PO HS 09/07/14 10/09/18 History


 


traMADol HCl [Tramadol HCl] 50 mg PO TID PRN 09/07/14 10/09/18 History


 


Potassium Chloride [K-Dur] 20 meq PO QAM-WM #0 tab 09/21/14 10/09/18 Rx


 


Aspirin 81 mg PO DAILY 07/24/18 10/09/18 History


 


Levemir Flexpen 25 unit SC QAM 07/24/18 10/09/18 History


 


Levemir Flexpen 30 unit SC HS 07/24/18 10/09/18 History














- History


PMHx: CAD, HFrEF, Cirrhosis, CVA, IDDM, CKD 3


 


PSHx: CABG 2014





FHx: noncontributory


 


Social: Former smoker


 








- Review of Systems


General: denies: fever/chills, weight/appetite/sleep changes


Eyes: denies: eye pain, vision changes


ENT: denies: nasal congestion, rhinorrhea


Respiratory: reports: shortness of breath, exercise intolerance.  denies: cough

, congestion


Cardiovascular: reports: edema.  denies: chest pain, palpitation


Gastrointestinal: denies: nausea, vomiting, diarrhea, constipation, abdominal 

pain


Skin: denies: rashes, lesions


Musculoskeletal: denies: pain, tenderness, stiffness, swelling


Neurological: denies: numbness, syncope, seizure


Psychological: denies: anxiety, depression





- Vital signs


BP: 116/59  HR: 73 RR: 20 Tmax: 97.7 Pox: 95% on 2L  Wt: 90.3   








- Physical Exam


Constitutional: NAD


HEENT: normocephalic and atraumatic, PERRLA, EOMI, MMM


Neck: supple, FROM, other (JVD present at 60 degrees)


Chest: no-tender to palpation, no lesions


Heart: RRR, normal S1/S2, no murmurs/rubs/gallops, other (2+ pitting edema to 

mid thigh)


Lungs: CTAB, no respiratory distress, no wheezing, other (decreased air 

movement on the right)


Abdomen: soft, other (fluid wave, distended)


Musculoskeletal: normal structure, normal tone


Neurological: CN II-XII intact


Skin: no jaundice


Heme/Lymphatic: no unusual bruising or bleeding, no purpura


Psychiatric: normal mood and affect





FMR H&P: Results





- Labs


Result Diagrams: 


 10/09/18 04:36





 10/09/18 04:36


Lab results: 


 











WBC  2.4 thou/uL (4.8-10.8)  L  10/08/18  22:00    


 


Hgb  7.6 g/dL (14.0-18.0)  L  10/08/18  22:00    


 


Hct  24.1 % (42.0-52.0)  L  10/08/18  22:00    


 


MCV  95.3 fL (78.0-98.0)   10/08/18  22:00    


 


Plt Count  33 thou/uL (130-400)  L  10/08/18  22:00    


 


Sodium  138 mmol/L (136-145)   10/08/18  22:00    


 


Potassium  3.7 mmol/L (3.5-5.1)   10/08/18  22:00    


 


Chloride  110 mmol/L ()  H  10/08/18  22:00    


 


Carbon Dioxide  20 mmol/L (23-31)  L  10/08/18  22:00    


 


BUN  35 mg/dL (8.4-25.7)  H  10/08/18  22:00    


 


Creatinine  1.63 mg/dL (0.6-1.3)  H  10/08/18  22:00    


 


Glucose  116 mg/dL ()  H  10/08/18  22:00    


 


Lactic Acid  1.1 mmol/L (0.5-2.2)   10/08/18  22:00    


 


Calcium  8.1 mg/dL (7.8-10.44)   10/08/18  22:00    


 


Total Bilirubin  0.8 mg/dL (0.2-1.2)   10/08/18  22:00    


 


AST  12 U/L (5-34)   10/08/18  22:00    


 


ALT  Less than  7 U/L (8-55)  L  10/08/18  22:00    


 


Alkaline Phosphatase  70 U/L ()   10/08/18  22:00    


 


CK-MB (CK-2)  1.0 ng/mL (0-6.6)   10/08/18  22:00    


 


B-Natriuretic Peptide  1840.3 pg/mL (0-100)  H  10/08/18  22:00    


 


Serum Total Protein  5.9 g/dL (5.8-8.1)   10/08/18  22:00    


 


Albumin  3.3 g/dL (3.4-4.8)  L  10/08/18  22:00    








Procalcitonin: 0.08





- Radiology Interpretation


  ** Chest x-ray


Status: report reviewed by me (changes of median sternotomy, heart is enlarged, 

pleural parenchymal changes with opacification of the right lower hemithorax. 

No pneumothoraces seen)





FMR H&P: A/P





- Problem List


(1) Acute respiratory failure with hypoxia


Status: Acute   Code(s): J96.01 - ACUTE RESPIRATORY FAILURE WITH HYPOXIA   





(2) SIRS without infection or organ dysfunction


Status: Acute   Code(s): R65.10 - SIRS OF NON-INFECTIOUS ORIGIN W/O ACUTE ORGAN 

DYSFUNCTION   





(3) Liver cirrhosis, alcoholic


Status: Acute   Code(s): K70.30 - ALCOHOLIC CIRRHOSIS OF LIVER WITHOUT ASCITES 

  





(4) CKD (chronic kidney disease) stage 3, GFR 30-59 ml/min


Status: Acute   Code(s): N18.3 - CHRONIC KIDNEY DISEASE, STAGE 3 (MODERATE)   





(5) CAD (coronary artery disease)


Status: Acute   Code(s): I25.10 - ATHSCL HEART DISEASE OF NATIVE CORONARY 

ARTERY W/O ANG PCTRS   





(6) Diabetes mellitus


Status: Acute   Code(s): E11.9 - TYPE 2 DIABETES MELLITUS WITHOUT COMPLICATIONS

   





(7) HTN (hypertension)


Status: Acute   Code(s): I10 - ESSENTIAL (PRIMARY) HYPERTENSION   





(8) S/P CABG x 4


Status: Acute   





- Plan





This is a 62 yo male with PMH of CAD s/p CABG, cirrhosis, HTN, IDDM, HFrEF








Acute hypoxic respiratory distress 2/2 CHF vs PNA


-Admit to tele. Procal was negative pointing more to CHF exacerbation. We will 

not continue the abx from the ER. We are currently giving pt. IV lasix to 

decrease fluid overload. CXR and CTA show pleural effusion and ascites. We will 

consult IR for drainage of these tomorrow 2/2 to his Plt of 33 and plan to send 

fluid to lab for analysis. 





SIRS criteria without signs of infection


-Pt. was tachypnenic, WBC of 2.4, and temp of 102.1. Procalcitonin is low r/o 

PNA. Exam is not convincing of Spontaneous bacterial peritonitis however we 

would have a low threshold for restarting Abx if pt. worsens during hospital 

stay.





HFrEF


-EF of 15-20 from echo on 7/25/18. Pt. is fluid overloaded based on exam and 

BNP of 1800. We will begin diuresing pt. as long as his bp and kidneys do not 

respond poorly. We are also adding spironolactone.





Pancytopenia likely 2/2 cirrhosis


-Continue to monitor. Hgb is 7.6 currently and may increase with diuresing 

however we would have a low threshold for transfusing due to symptoms and 

cardiac history.





CKD 3


-We will monitor while giving lasix and dose meds appropriately





IDDM


-Home meds, ACHS accuchecks, SSI





CAD


-Continue home meds





HTN


-Continue home meds








Code: full


Prophylaxis: SCDs


Family: None at bedside


Disposition: Home in 3-4 days





FMR H&P: Upper Level





- Pertinent history





62 yo WM PMH end stage cirrhosis, HFrEF (EF 15-20% in July), CAD s/p CABG x4 vz

, and IDDM. Presents with 1-2 day history of progressively worsening shortness 

of breath. [facility] 

[facility] his [facility]. Last paracentesis was 1 month ago. States he is 

concerned his heart medications are not correct and are causing his current 

symptoms. Was seen in the hospital in July but left AMA after approximately 24 

hours. States he took Lasix 80 mg before he came to ER. 





ER: Labs, EKG, CXR, CTA-chest, Vanc, Zosyn, Azithromycin, Lasix 20 mg. 





- Pertinent findings





Vitals: /67, Tmax 102.1F, resp 30, SpO2 96% on 2L, pulse 75, 


GEN: Mild respiratory distress, speaks in full sentences, A&Ox4, 


CV: RRR, no murmur


Lungs: CTA-B, increased respiratory effort, no signs of fatigue


Abdomen: NT, mild distention, BSx4 quadrants, fluid wave felt


Extremities: 1+ pitting edema to mid tibia. 





Labs: BNP 1840, Cr 1.63 (Baseline), albumin 3.3, INR 1.4, CBC 2.4, H&H 7.6/24.1

, Platelets 33, lactic acid 1.1





EKG: pulse 83 NSR, left axis deviation, QTc 447, no ST changes. 





CXR: Stable right plerual effusion





CTA-Chest: No PE, possible left sided PNA vs atelectesis 





- Plan


Date/Time: 10/09/18 0009








I, Jose C Zaragoza MD, have evaluated this patient and agree with findings/plan as 

outlined by intern resident. Pertinent changes/additions are listed here.





1. Acute Hypoxic respiratory distres 2/2 CAP vs. CHF exacerbation: will check 

procalcitonin to determine need for antibiotics. continue diuresis with lasix 

40 mg IVP BID, Prn oxygen and duoneb. 


2. Sepsis 2/2 Pneumonia: Check procalcitonin, abx continued based on results. 


3. Acute HFrEF exacerbation: lasix, fluid restrict 1500 mL/day, daily weights, 

strict I&O, med rec and continue home meds


4. End stage cirrhosis: will have IR perform therapeutic paracentesis tomorrow, 

needs started on spironolactone. 


5. Pleural effusion: IR vs Pulmonology to perform thoracentesis for respiratory 

relief and diagnosis of fluid, does not appear loculated on CT scan so likely 

transudative, 


6. CKD3: monitor daily BMP


7. IDDM: med rec, home meds, SSI, ACHS checks, hypoglycemics available 


8. CAD s/p CABG x4 vessel: Home meds, 


9. Pancytopenia 2/2 #3: monitor, no anticoagulation, 


10. Hx EtOH and tobacco abuse: encourage continued cessation 


11. Diet: HH, CC, fluid restrict 1500 mL/day


12. PPx: SCD, fall


13. Code: FULL





Dispo: inpatient, tele, >2 midnights





Discussed with Dr. Amor. 





Attending Addendum





- Attending Addendum


Date/Time: 10/09/18 8266





I personally evaluated the patient and discussed the management with Dr. Donahue


I agree with the History, Examination, Assessment and Plan documented above 

with any addition or exceptions noted below- Patient left AMA  prior to my 

seeing him.

## 2018-12-21 ENCOUNTER — HOSPITAL ENCOUNTER (EMERGENCY)
Dept: HOSPITAL 92 - ERS | Age: 63
Discharge: HOME | End: 2018-12-21
Payer: MEDICARE

## 2018-12-21 DIAGNOSIS — F43.10: ICD-10-CM

## 2018-12-21 DIAGNOSIS — I50.9: ICD-10-CM

## 2018-12-21 DIAGNOSIS — F31.9: ICD-10-CM

## 2018-12-21 DIAGNOSIS — S30.813A: Primary | ICD-10-CM
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
## 2018-12-21 DIAGNOSIS — I25.2: ICD-10-CM

## 2018-12-21 DIAGNOSIS — J44.9: ICD-10-CM

## 2018-12-21 DIAGNOSIS — F41.9: ICD-10-CM

## 2018-12-21 DIAGNOSIS — Z87.891: ICD-10-CM

## 2018-12-21 DIAGNOSIS — I11.0: ICD-10-CM

## 2018-12-21 DIAGNOSIS — E78.5: ICD-10-CM

## 2018-12-21 DIAGNOSIS — E11.9: ICD-10-CM

## 2018-12-21 PROCEDURE — 99283 EMERGENCY DEPT VISIT LOW MDM: CPT

## 2019-04-25 ENCOUNTER — HOSPITAL ENCOUNTER (INPATIENT)
Dept: HOSPITAL 92 - ERS | Age: 64
LOS: 2 days | Discharge: LEFT BEFORE DISCHARGE | DRG: 438 | End: 2019-04-27
Attending: HOSPITALIST | Admitting: HOSPITALIST
Payer: MEDICARE

## 2019-04-25 ENCOUNTER — HOSPITAL ENCOUNTER (EMERGENCY)
Dept: HOSPITAL 92 - ERS | Age: 64
Discharge: LEFT BEFORE DISCHARGE | End: 2019-04-25
Payer: OTHER GOVERNMENT

## 2019-04-25 VITALS — BODY MASS INDEX: 21.5 KG/M2

## 2019-04-25 DIAGNOSIS — I08.1: ICD-10-CM

## 2019-04-25 DIAGNOSIS — D61.818: ICD-10-CM

## 2019-04-25 DIAGNOSIS — Z79.82: ICD-10-CM

## 2019-04-25 DIAGNOSIS — Z53.21: Primary | ICD-10-CM

## 2019-04-25 DIAGNOSIS — F10.19: ICD-10-CM

## 2019-04-25 DIAGNOSIS — F41.9: ICD-10-CM

## 2019-04-25 DIAGNOSIS — K86.89: Primary | ICD-10-CM

## 2019-04-25 DIAGNOSIS — Z79.02: ICD-10-CM

## 2019-04-25 DIAGNOSIS — B19.20: ICD-10-CM

## 2019-04-25 DIAGNOSIS — F43.10: ICD-10-CM

## 2019-04-25 DIAGNOSIS — E11.22: ICD-10-CM

## 2019-04-25 DIAGNOSIS — E11.65: ICD-10-CM

## 2019-04-25 DIAGNOSIS — D53.9: ICD-10-CM

## 2019-04-25 DIAGNOSIS — E78.5: ICD-10-CM

## 2019-04-25 DIAGNOSIS — K70.31: ICD-10-CM

## 2019-04-25 DIAGNOSIS — I25.2: ICD-10-CM

## 2019-04-25 DIAGNOSIS — I25.10: ICD-10-CM

## 2019-04-25 DIAGNOSIS — Z95.5: ICD-10-CM

## 2019-04-25 DIAGNOSIS — I50.22: ICD-10-CM

## 2019-04-25 DIAGNOSIS — I81: ICD-10-CM

## 2019-04-25 DIAGNOSIS — N18.3: ICD-10-CM

## 2019-04-25 DIAGNOSIS — F32.9: ICD-10-CM

## 2019-04-25 DIAGNOSIS — I25.5: ICD-10-CM

## 2019-04-25 DIAGNOSIS — I13.0: ICD-10-CM

## 2019-04-25 DIAGNOSIS — Z79.4: ICD-10-CM

## 2019-04-25 LAB
ALBUMIN SERPL BCG-MCNC: 3.1 G/DL (ref 3.4–4.8)
ALP SERPL-CCNC: 66 U/L (ref 40–150)
ALT SERPL W P-5'-P-CCNC: 7 U/L (ref 8–55)
ANION GAP SERPL CALC-SCNC: 12 MMOL/L (ref 10–20)
APAP SERPL-MCNC: (no result) MCG/ML (ref 10–30)
APTT PPP: 34.6 SEC (ref 22.9–36.1)
AST SERPL-CCNC: 12 U/L (ref 5–34)
BILIRUB SERPL-MCNC: 0.7 MG/DL (ref 0.2–1.2)
BUN SERPL-MCNC: 59 MG/DL (ref 8.4–25.7)
CALCIUM SERPL-MCNC: 8.3 MG/DL (ref 7.8–10.44)
CHLORIDE SERPL-SCNC: 107 MMOL/L (ref 98–107)
CO2 SERPL-SCNC: 22 MMOL/L (ref 23–31)
CREAT CL PREDICTED SERPL C-G-VRATE: 0 ML/MIN (ref 70–130)
GLOBULIN SER CALC-MCNC: 2.4 G/DL (ref 2.4–3.5)
GLUCOSE SERPL-MCNC: 130 MG/DL (ref 80–115)
HGB BLD-MCNC: 8.1 G/DL (ref 14–18)
INR PPP: 1.4
LIPASE SERPL-CCNC: 56 U/L (ref 8–78)
MCH RBC QN AUTO: 31.2 PG (ref 27–31)
MCV RBC AUTO: 96.3 FL (ref 78–98)
MDIFF COMPLETE?: YES
OVALOCYTES BLD QL SMEAR: (no result) (100X)
PLATELET # BLD AUTO: 41 THOU/UL (ref 130–400)
POTASSIUM SERPL-SCNC: 4.4 MMOL/L (ref 3.5–5.1)
PROTHROMBIN TIME: 17.1 SEC (ref 12–14.7)
RBC # BLD AUTO: 2.59 MILL/UL (ref 4.7–6.1)
SALICYLATES SERPL-MCNC: (no result) MG/DL (ref 15–30)
SODIUM SERPL-SCNC: 137 MMOL/L (ref 136–145)
WBC # BLD AUTO: 3.9 THOU/UL (ref 4.8–10.8)

## 2019-04-25 PROCEDURE — 96376 TX/PRO/DX INJ SAME DRUG ADON: CPT

## 2019-04-25 PROCEDURE — 80307 DRUG TEST PRSMV CHEM ANLYZR: CPT

## 2019-04-25 PROCEDURE — 83880 ASSAY OF NATRIURETIC PEPTIDE: CPT

## 2019-04-25 PROCEDURE — 71045 X-RAY EXAM CHEST 1 VIEW: CPT

## 2019-04-25 PROCEDURE — 83605 ASSAY OF LACTIC ACID: CPT

## 2019-04-25 PROCEDURE — 85730 THROMBOPLASTIN TIME PARTIAL: CPT

## 2019-04-25 PROCEDURE — 90670 PCV13 VACCINE IM: CPT

## 2019-04-25 PROCEDURE — 84484 ASSAY OF TROPONIN QUANT: CPT

## 2019-04-25 PROCEDURE — 36416 COLLJ CAPILLARY BLOOD SPEC: CPT

## 2019-04-25 PROCEDURE — 93005 ELECTROCARDIOGRAM TRACING: CPT

## 2019-04-25 PROCEDURE — 82140 ASSAY OF AMMONIA: CPT

## 2019-04-25 PROCEDURE — 85610 PROTHROMBIN TIME: CPT

## 2019-04-25 PROCEDURE — 36415 COLL VENOUS BLD VENIPUNCTURE: CPT

## 2019-04-25 PROCEDURE — 80048 BASIC METABOLIC PNL TOTAL CA: CPT

## 2019-04-25 PROCEDURE — 82550 ASSAY OF CK (CPK): CPT

## 2019-04-25 PROCEDURE — 74177 CT ABD & PELVIS W/CONTRAST: CPT

## 2019-04-25 PROCEDURE — 96365 THER/PROPH/DIAG IV INF INIT: CPT

## 2019-04-25 PROCEDURE — 96361 HYDRATE IV INFUSION ADD-ON: CPT

## 2019-04-25 PROCEDURE — 80053 COMPREHEN METABOLIC PANEL: CPT

## 2019-04-25 PROCEDURE — 87040 BLOOD CULTURE FOR BACTERIA: CPT

## 2019-04-25 PROCEDURE — 85025 COMPLETE CBC W/AUTO DIFF WBC: CPT

## 2019-04-25 PROCEDURE — G0009 ADMIN PNEUMOCOCCAL VACCINE: HCPCS

## 2019-04-25 PROCEDURE — 96367 TX/PROPH/DG ADDL SEQ IV INF: CPT

## 2019-04-25 PROCEDURE — 96375 TX/PRO/DX INJ NEW DRUG ADDON: CPT

## 2019-04-25 PROCEDURE — 83690 ASSAY OF LIPASE: CPT

## 2019-04-25 PROCEDURE — 90471 IMMUNIZATION ADMIN: CPT

## 2019-04-25 NOTE — CT
CT of abdomen and pelvis:

4/25/2019



COMPARISON: None



HISTORY: Pain, paracentesis



TECHNIQUE: Axial CT imaging at 5 mm intervals from lung bases through pubic symphysis with IV contras
t. Coronal reformatted imaging obtained.



FINDINGS: Coronary arterial calcification noted, incompletely assessed. There is a small right pleura
l effusion. There is no free intraperitoneal air. There is large volume ascites throughout the

abdomen and pelvis. The liver demonstrates a cirrhotic configuration. Limited assessment of the gallb
ladder appears grossly unremarkable. There is marked enlargement of the spleen, which measures 21

cm in AP dimension.



The portal vein appears patent. There appears to be a filling defect within the central most aspect o
f the splenic vein on image 34. There is an abnormal soft tissue structure inferior to the region

of the pancreatic body and separable from the pancreatic head in the expected location of the superio
r mesenteric vein. This is best seen on image 42 and measures 4.7 x 4.6 cm. This could represent a

deedee mass, and exophytic malignancy associated with the pancreatic head and uncinate process, and/or
 clot within the central mesenteric venous structures with associated expansion. This is favored to

represent tumor with venous extension either on the basis of deedee mass or pancreatic malignancy. It 
is difficult to make this distinction given the degree of ascites in this region as well as the

lack of oral contrast media.



Limited assessment of the bowel demonstrates no evidence for obstruction. There are varices within th
e marley hepatis.



There is severe extensive atherosclerotic calcification of the abdominal aorta and its branches.



The osseous structures demonstrate no worrisome lytic or blastic bone lesions.



IMPRESSION: Ill-defined central soft tissue mass lesion inseparable from the inferior aspect of the p
ancreatic head and uncinate process. This is suspicious for malignancy. This may invade the

adjacent venous structures with associated superior mesenteric vein clot. There is a cirrhotic config
uration of the liver with evidence of portal hypertension and large volume ascites.



A follow-up examination with oral contrast media may be beneficial to better assess the abnormality s
een within the central aspect of the abdomen. Alternatively, if the patient is able, an MRI of the

abdomen would be the study of choice.



Dr. Maddox made aware via phone at 11:20 PM hours 4/25/2019



Reported By: Abhay Colvin 

Electronically Signed:  4/25/2019 11:22 PM

## 2019-04-25 NOTE — RAD
Portable frontal chest radiograph:

4/25/2019



COMPARISON: 10/8/2018



HISTORY: Pain



FINDINGS: Mild stable increased linear interstitial density. Midline sternotomy wires and mediastinal
 clips noted. No pneumothorax, pleural fluid, focal consolidation, or alveolar edema.



IMPRESSION: Chronic findings as detailed above.



Reported By: Abhay Colvin 

Electronically Signed:  4/25/2019 11:08 PM

## 2019-04-26 VITALS — DIASTOLIC BLOOD PRESSURE: 61 MMHG | SYSTOLIC BLOOD PRESSURE: 116 MMHG

## 2019-04-26 RX ADMIN — INSULIN LISPRO PRN UNIT: 100 INJECTION, SOLUTION INTRAVENOUS; SUBCUTANEOUS at 18:36

## 2019-04-26 RX ADMIN — INSULIN LISPRO PRN UNIT: 100 INJECTION, SOLUTION INTRAVENOUS; SUBCUTANEOUS at 13:10

## 2019-04-26 NOTE — HP
PRIMARY CARE DOCTOR:  [facility]



CODE STATUS:  Full code.



TIME OF EVALUATION:  04:00 a.m.



CHIEF COMPLAINT:  Abdominal pain.



HISTORY OF PRESENT ILLNESS:  This is a 64-year-old male patient with past 
medical

history of cirrhosis of the liver, came to the hospital after having severe

abdominal pain.  The patient [facility] 
[place]

and after that he started developing severe abdominal pain, was gradually 
getting

worse as today he could not handle it anymore, he decided to come to the 
hospital.

The patient had some relief only after medication were given in the ER.  No 
clear

triggers.  CAT scan showed that the patient might have a pancreatic mass and 
also

possible associated mesenteric venous thrombosis associated with a mass, and 
also

cirrhosis of the liver.  Symptoms were severe, associated with nausea. 



REVIEW OF SYSTEMS:  CONSTITUTIONAL:  No fever or chills.  The patient reported

generalized weakness. 

RESPIRATORY:  No cough, sputum production, or shortness of breath. 

CARDIOVASCULAR:  No chest pain, palpitation, or agitation.  The patient had 
nausea.

No vomiting.  No diarrhea.  Severe abdominal pain. 

CNS:  No dizziness, headache, or feeling lightheaded. 

GENITOURINARY:  No burning on urination. 

EXTREMITIES:  No leg swelling. 



All other systems were reviewed and negative except for the findings mentioned 
above.



PAST MEDICAL HISTORY:  Positive for cirrhosis of the liver, ascites, congestive

heart failure, kidney failure, previous MI, diabetes, hyperlipidemia, 
hypertension,

and COPD. 



FAMILY HISTORY:  Reviewed, noncontributory to this case.



SURGICAL HISTORY:  Attempted stent placement was successful, cardiac bypass 2014
,

trach tube in 2017, it was removed, PEG tube in 2017. 



PSYCHIATRIC HISTORY:  Anxiety, bipolar disorder, depression, and PTSD.



SOCIAL HISTORY:  The patient lives with his spouse.  No alcohol.  No drugs.  
Former

smoker, the patient quit in 2017. 



KNOWN ALLERGIES:  No known drug allergies reported.



MEDICATIONS:  

1. Albuterol.

2. Carvedilol.

3. Folic acid.

4. Gabapentin.

5. Levemir.

6. Isosorbide dinitrate.

7. Lactulose.

8. Nitroglycerin.

9. Potassium chloride.

10. Ranitidine.

11. Spironolactone.

12. Thiamine.

13. Tramadol. 

14. Xifaxan.

15. Rapaflo.



PHYSICAL EXAMINATION:

VITAL SIGNS:  On presentation, blood pressure 115/65 with heart rate 76, 
respiratory

rate was 18, temperature 98.4, pain was 8/10, oxygen saturation 97% on room 
air. 

GENERAL APPEARANCE:  The patient is alert, oriented, not in acute distress. 

HEENT:  Eyes, normal conjunctivae.  Moist oral mucosa.  Anicteric.  No JVD. 

RESPIRATORY:  Bilateral air entry.  No rales.  No wheezes.  Symmetric 
expansion. 

CARDIOVASCULAR:  Normal rate, regular rhythm.  No murmurs.  No gallop.  No 
edema. 

ABDOMEN:  Soft.  At the time of my examination, nontender.  The patient reported

that it is relieved with pain medication.  Normal bowel sounds. MUSCULOSKELETAL:

Baseline range of motion and strength.  No tenderness. 

SKIN:  Warm, intact.  No pallor.  No rash.  No redness.  Peripheral pulses are

present.  Capillary refill seems to be intact. 

NEUROLOGIC:  No evidence of any new focal weakness.  Baseline speech.  Cranial

nerves seems to be intact. 

PSYCHIATRIC:  The patient is in good mood.  No anxiety.  Optimal judgment.



IMAGING:  EKG was reviewed, normal sinus rhythm with a rate of 67, , QRS 
100,

QT corrected 528, left anterior fascicular block, inferior infarct, age

undetermined, anterior infarct, age undetermined, prolonged QT. 



Abdomen and pelvis CT showed ill-defined central soft tissue mass lesion 
ancipital

from the inferior aspect of the pancreatic head and uncinate process. This is

suspicious for malignancy.  This may have invaded the adjacent venous structure 
with

associated superior mesenteric vein clot.  A cirrhotic configuration of the 
liver

with evidence of portal hypertension and large volume ascites.  A Followup

examination with oral contrast medium might be efficient since the abnormality 
is

seen within the central aspect of the abdomen and tentatively if the patient is

able, an MRI of the abdomen could be the study of choice. 



LABORATORY DATA:  Labs were reviewed.  The patient has white count 3.9, 
hemoglobin

8.1, hematocrit 25, MCV 96.3, platelet count was 41. Coagulation; PT 17.1, INR 
1.4,

PTT 34.6. Chemistry; sodium 137, potassium 4.4, chloride 107, carbon dioxide 22,

anion gap 12.  BUN 59, creatinine 1.82, previous creatinine was 1.61, GFR 38,

glucose 130, lactic acid 1.2, calcium 9.3, total bilirubin 0.7.  AST 12, ALT 7,

alkaline phosphatase 66, ammonia 21.  CK less than 9.  Troponin was negative.

Beta-natriuretic peptide was 363.9.  Serum total protein 5.5, albumin 3.1, 
globulin

2.4, albumin globulin ratio is 1.3 with lipase 56.  Toxicology, salicylate 
negative,

acetaminophen negative.  Plasma alcohol was less than 10. 



ASSESSMENT AND PLAN:  The patient will be placed in the hospital with following

medical problems: 

1. Severe abdominal pain that seemed to be related to possible pancreatic mass

versus possible thrombosis of the superior mesenteric vein.  GI has been 
consulted,

we will follow recommendations.  No recent anticoagulations.  The patient is at 
high

risk for severe bleeding given pancytopenia.  We will defer to GI for any 
further

recommendations. 

2. Pancytopenia.  It is likely secondary to liver cirrhosis, it is recovering 
from

before, that was about 24 in previous admission.  No evidence of bleeding, we 
will

monitor. 

3. Oral anticoagulation with INR of 1.4, PT 17.1, no bleeding, we will monitor.

This could be secondary to liver cirrhosis. 

4. Chronic kidney disease with GFR 38, so this is stage III, creatinine is 
elevated

than previous admissions.  We will monitor.  We will adjust treatment as 
needed. 

5. Uncontrolled diabetes with hyperglycemia.  The patient will be restarted on

Levemir.  We will place the patient on sliding scale for optimal control. 

6. Decompensated liver cirrhosis with elevated INR, pancytopenia, background

ascites, GI has been consulted, we will monitor.  Reconcile home medications.

Ammonia level was normal. 

7. Hypotension on presentation with systolic in the 80s, the patient required 
fluid

resuscitation, we will monitor, initially was a suspicion for a possible 
infection

less likely SBP, overnight, the patient's blood pressure remained stable.  
Infection

might not be the most likely diagnosis at this point. 

8. Deep venous thrombosis prophylaxis.  The patient is anticoagulated from the 
liver

disease and pancytopenic.  We would not add any anticoagulants for prophylaxis. 

9. History of congestive heart failure as reported in the medical history, 
reconcile

home medications, adjust treatment as needed. 

10. History of hyperlipidemia.  Low-cholesterol diet is advised.







Job ID:  [id number]



[facility]

## 2019-04-26 NOTE — CON
DATE OF CONSULTATION:  04/26/2019



SERVICE:  Pulmonary Medicine.



INTERVAL HISTORY:  The patient is a 64-year-old  white male with past

medical history significant for cirrhosis secondary to alcohol abuse and 
hepatitis

C.  He has been sober for over 2 years now.  He quit drinking whenever he 
started

having severe liver problems.  He gets a paracentesis roughly once a month.

Typically, they take off over 7 L.  His most recent tap was on Monday.  Ever 
since

that tap, however, he had abdominal pain on the bilateral aspects of his belly.
  He

stops eating food.  He presented to the emergency department, and had some 
marginal

blood pressures.  He was treated as though he had spontaneous bacterial 
peritonitis

and tucked into the IMCU because of those blood pressures.  Overnight, things 
firmed

up.  He got the pain medicine and antibiotic and his belly pain is now 
resolved.  He

denies any current fevers, chills, nausea, or vomiting.  He does have diarrhea

whenever he takes his lactulose.  He is currently on a hiatus from that.  He 
has no

cough, sputum production, shortness of breath, paroxysmal nocturnal dyspnea, or

orthopnea.  He is otherwise in his usual state of health and has no complaints. 



PAST MEDICAL HISTORY:  

1. Cirrhosis secondary to hepatitis C and alcohol abuse.

2. Chronic kidney disease, stage 3.

3. Type 2 diabetes mellitus.

4. Hypertension.

5. Dyslipidemia.

6. Coronary artery disease.

7. COPD.



PAST SURGICAL HISTORY:  

1. Coronary artery bypass graft surgery in 2014.

2. Tracheostomy tube in 2017 with subsequent decannulation.

3. PEG tube placement in 2017.



SOCIAL HISTORY:  Negative for alcohol, tobacco, or illicit drug use.  He has a

remote history of tobacco abuse and alcohol abuse.  He quit both these things 
about

2 years ago. 



FAMILY HISTORY:  Noncontributory.



ALLERGIES:  NO KNOWN DRUG ALLERGIES.



MEDICATIONS:  List of his inpatient medications was reviewed.  No specific 
updates

were made at this time. 



REVIEW OF SYSTEMS:  General; head, ears, eyes, nose, throat; cardiovascular;

respiratory; GI; ; musculoskeletal; neurologic; and skin are negative except 
as

mentioned in the HPI. 



PHYSICAL EXAMINATION:

VITAL SIGNS:  Afebrile, pulse 64, blood pressure 123/64, respirations 16, and

saturation 98% on room air. 

GENERAL:  The patient is awake and alert, in no apparent distress. 

LUNGS:  Excellent air entry without any prolonged expiratory phase or wheezing

present. 

HEART:  Normal rate and regular. 

ABDOMEN:  Soft.  Distended with ascites.  There is no rebound or guarding.  
Bowel

sounds are present. 

MUSCULOSKELETAL:  No cyanosis or clubbing.  There is no pitting in the bilateral

lower extremities. 

NEUROLOGIC:  Grossly nonfocal.  He has no asterixis.



LABORATORY DATA:  WBC 3.9, hemoglobin 8.1, platelets 41,000.  Band count 3%.  
INR

1.4.  Toxicology for salicylates, acetaminophen, and alcohol are negative.

Creatinine 1.82, which is not much above his baseline.  Liver function studies

otherwise unremarkable.  CK is below the assay limit of 9, BNP is in historic 
low,

troponin 0.025, ammonia is normal.  Lactate 1.2.  Blood sugar ranges from 189 to

230.  Blood cultures x2 are negative. 



IMAGING STUDIES:  

1. CT of the abdomen and pelvis demonstrates ascites.  There is cirrhotic 
morphology

to the liver.  Inferior aspect of the pancreatic head has a mass lesion on it. 

2. Chest x-ray demonstrates no acute cardiopulmonary abnormality.



ASSESSMENT:  

1. Severe abdominal pain, resolved.

2. Cirrhosis secondary to alcohol abuse and hepatitis C.

3. Mesenteric vein thrombus.

4. Pancreatic mass.



DISCUSSION AND PLAN:  The patient is stable for transition out of the IMCU to 
the

medical unit.  He currently has no end-organ damage and is otherwise it is 
baseline.

 His BNP is in historic low and he has no signs of volume overload outside of 
his

ascites.  As such, home medications will be continued.  He has no further

requirements for inpatient Pulmonary/Critical Care opinion, so when he gets to 
the

floor, I will sign off.  Please call with additional questions or concerns 
through

time. 



70 minutes have been devoted to this patient in various activities.  I 
personally reviewed all imaging studies and laboratory data noted within this 
document.  For fifty percent of this time, I was interacting with the patient 
at the bedside or coordinating care with the care team.  For the remainder of 
the time I was immediately available to the patient in the hospital unit.  



Job ID:  [id number]



[facility]

## 2019-04-27 VITALS — TEMPERATURE: 97.7 F

## 2019-04-27 LAB
ANION GAP SERPL CALC-SCNC: 12 MMOL/L (ref 10–20)
BUN SERPL-MCNC: 57 MG/DL (ref 8.4–25.7)
CALCIUM SERPL-MCNC: 8.4 MG/DL (ref 7.8–10.44)
CHLORIDE SERPL-SCNC: 112 MMOL/L (ref 98–107)
CO2 SERPL-SCNC: 20 MMOL/L (ref 23–31)
CREAT CL PREDICTED SERPL C-G-VRATE: 47 ML/MIN (ref 70–130)
GLUCOSE SERPL-MCNC: 113 MG/DL (ref 80–115)
HGB BLD-MCNC: 7.8 G/DL (ref 14–18)
MCH RBC QN AUTO: 32.6 PG (ref 27–31)
MCV RBC AUTO: 98.6 FL (ref 78–98)
MDIFF COMPLETE?: YES
OVALOCYTES BLD QL SMEAR: (no result) (100X)
PLATELET # BLD AUTO: 35 THOU/UL (ref 130–400)
POTASSIUM SERPL-SCNC: 4.5 MMOL/L (ref 3.5–5.1)
RBC # BLD AUTO: 2.4 MILL/UL (ref 4.7–6.1)
SODIUM SERPL-SCNC: 139 MMOL/L (ref 136–145)
WBC # BLD AUTO: 3.6 THOU/UL (ref 4.8–10.8)

## 2019-04-27 NOTE — CON
DATE OF CONSULTATION:  04/26/2019



CHIEF COMPLAINT:  Abdominal pain.



HISTORY OF PRESENT ILLNESS:  Mr. Venegas is a 64-year-old man who [facility] [place] [facility], which is decompensated.  He just had a paracentesis

performed on Tuesday, three days ago.  He states that the usual provider who does

the paracentesis was not available and one of his associates performed the

paracentesis.  He had an attempted paracentesis in the right lower quadrant,

however, this apparently was a dry tap and the needle was then repositioned and

paracentesis was performed in the left lower quadrant and 7 L were removed.  Mr. Venegas reports that he had significant pain in the right lower quadrant after the

paracentesis and this worsened such that he came to the emergency room yesterday for

further care.  During evaluation, he had a CT scan of the abdomen and pelvis, which

incidentally showed a possible mass in the pancreas versus blood clot in the

mesenteric vein in that area.  He has had no nausea or vomiting associated with

this.  His abdominal pain has since resolved.  He states that throughout the day

today, he has absolutely no pain and is ready to go home.  He wishes to have the

pancreatic lesion and evaluated [facility] at the [facility].  He has no diarrhea,

constipation, or blood in the stool. 



PAST MEDICAL HISTORY:  Cirrhosis secondary to past hepatitis C and alcohol.  The

hepatitis C was reportedly cured with antiviral therapy last year.  Chronic kidney

disease, diabetes mellitus type 2, hypertension, hyperlipidemia, COPD, and coronary

artery disease. 



PAST SURGICAL HISTORY:  Coronary artery bypass graft, cataract surgery.



FAMILY HISTORY:  Negative for GI malignancy.



SOCIAL HISTORY:  He quit alcohol at least two and half years ago.  He quit smoking a

couple of years ago.  No drugs.  [facility] and is a [place] . 



ALLERGIES:  NO KNOWN DRUG ALLERGIES.



MEDICATIONS:  

1. Aspirin.

2. Atorvastatin.

3. Carvedilol.

4. Plavix.

5. Furosemide.

6. Insulin.

7. Lisinopril.

8. Potassium.

9. Tramadol.



REVIEW OF SYSTEMS:  Negative x10 systems reviewed except as stated in the history of

present illness. 



PHYSICAL EXAMINATION:

VITAL SIGNS:  Temperature 98.2, blood pressure 124/64, pulse 64. 

GENERAL:  He is in no acute distress.  He is alert and oriented x3. 

HEENT:  Eyes have no scleral icterus.  Oropharynx is clear without lesions. 

NECK:  No cervical or supraclavicular lymphadenopathy. 

LUNGS:  Clear to auscultation bilaterally. 

HEART:  Regular rate and rhythm without murmur. 

ABDOMEN:  Soft, mildly distended without guarding and bowel sounds are present. 

EXTREMITIES:  No lower extremity edema. 

NEUROLOGIC:  There is no asterixis on neurological exam.



LABORATORY DATA:  White blood cell count 3.9, hemoglobin 8.1, platelets 41.

Creatinine 1.82, bilirubin 0.7, AST 12, ALT 7, alkaline phosphatase 66.  . 



IMAGING:  He had CT scan of the abdomen and pelvis on 04/25/2019, this showed

splenomegaly with the spleen size of 21 cm.  The liver had a cirrhotic

configuration.  An abnormal soft tissue structure was noted in the pancreatic body

in the area of the superior mesenteric vein.  This was thought to be either a mass

of the pancreas versus a possible blood clot in the mesenteric vein in this area. 



IMPRESSION:  

1. Cirrhosis of the liver.  He is decompensated with ascites and elevated INR and

hypoalbuminemia.  Status of his hepatoma screening is not known at this time as this

is [facility].  He has followup already scheduled with [facility] at the [facility] in

[place] and he wishes to continue his care for his cirrhosis there.  I do not know

the status of his varices screening either at this time. 

2. Abdominal pain, currently resolved.  This apparently started after paracentesis

on Tuesday.  There were no signs of significant complications at this point. 

3. Incidental finding of possible pancreatic mass versus a question of a thrombus in

the superior mesenteric vein.  He is on Plavix and aspirin and has significant

thrombocytopenia.  Status of his varices is not known.  He is potentially high risk

for anticoagulation and states that he would not take it.  The next step in

evaluation of this should be endoscopic ultrasound to evaluate for pancreatic mass

and Doppler can also be performed on vasculature in the area.  We do not perform EUS

at this facility, but again he wishes to follow this up further through the [facility]. 



RECOMMENDATIONS:  

1. His pain is resolved and he should be ready to discharge home tomorrow morning.

2. His renal function should continue to be followed.  His creatinine is baseline

around 1.6 to 1.8.  He is on diuretics at home per his report, but I do not have

those on his __________ listed above and it is not confirmed.  He does not know his

medications off the top of his head at this point. 

3. I gave him a copy of the CT scan report to take with him to his GI appointment

and his primary care physician to evaluate this further.  Again, endoscopic

ultrasound is likely the next step and he will require referral for that. 

4. He will continue varices screening and hepatoma screening and management of his

diuretics and ascites through the [facility]. 

5. Please call if GI can be of assistance.







Job ID:  [id number]

## 2019-04-27 NOTE — DIS
DATE OF ADMISSION:  04/26/2019



DATE OF DISCHARGE:  04/27/2019



PRIMARY CARE PHYSICIAN:  [facility].



DISCHARGE DISPOSITION:  Home.



PRIMARY DIAGNOSES:  

1. Severe abdominal pain, resolved.

2. New diagnosis of pancreatic mass.



SECONDARY DIAGNOSES:  

1. Ischemic cardiomyopathy.

2. Pancytopenia.

3. Moderate mitral and tricuspid regurgitation.

4. Macrocytic anemia.

5. Hypertension.

6. History of mitral valve repair.

7. Diabetes, type 2.

8. Chronic kidney disease, stage 3.

9. Cirrhosis of liver secondary to alcohol and hepatitis C.

10. Chronic systolic heart failure with stage C.

11. Coronary artery disease.

12. Alcohol abuse.



PRIMARY PROCEDURE/OPERATION:  None.



RADIOLOGICAL INVESTIGATION:  CT abdomen and pelvis, chest x-ray.



SIGNIFICANT LABORATORY DATA:  Hemoglobin 7.8.  INR 1.4.  Creatinine 1.57.



DISCHARGE MEDICATIONS:  

1. ProAir 2 puffs q.6 hourly p.r.n.

2. Folic acid 1 mg daily.

3. Gabapentin 300 mg p.o. at bedtime.

4. Isordil 20 mg p.o. b.i.d.

5. Lactulose 10 g p.o. b.i.d.

6. Levemir 20 units in the morning and 10 units at bedtime.

7. Potassium chloride 10 mEq p.o. daily.

8. Ranitidine 150 mg b.i.d.

9. Aldactone 100 mg daily.

10. Thiamine 100 mg daily.

11. Tramadol 50 mg t.i.d. p.r.n.

12. Nitroglycerin p.r.n.

13. Coreg 12.5 mg b.i.d.

14. Lasix 40 mg p.o. daily.



CONTRAINDICATION:  The patient is not on ACE inhibitor because the patient has

alcoholic cirrhosis with ascites and the ACE inhibitor and ARB is contraindicated. 



INPATIENT CONSULTANT:  Dr. Hung Deutsch and Dr. Adams.



CODE STATUS:  Full code.



ALLERGIES:  NO KNOWN DRUG ALLERGIES.



DISCHARGE PLAN:  [facility]hospital, [facility].  The

patient will follow up in the outpatient basis for endoscopic ultrasound. 



HOSPITAL COURSE:  A 64-year-old male with above-mentioned medical problem, who was

admitted by Dr. Cervantes.  Please see his H and P for further details.  The patient

was admitted for acute abdominal pain.  He had a recently paracentesis done and the

patient had ruled out SBP.  His pain was improved with pain medication.  He was

admitted in the hospital for suspected SBP, but he did not have any symptoms

suggestive of SBP during this admission.  His pain was completely resolved and he

was stable.  During this admission, gastroenterologist and pulmonologist saw and

gastroenterologist recommended that he will need outpatient endoscopic ultrasound

for his new finding of pancreatic mass.  The patient is also feeling much better and

he does not want to stay in the hospital today and he wants to go home.  He will

continue all his previous medication without any change and he will follow up with

his primary care physician and gastroenterologist and for more investigation, he

will go to [place]. 



Overall, the patient is medically stable for discharge.  I have seen and examined

this patient at the bedside personally.  His examination is completely unremarkable

other than ascites. 







Job ID:  [id number]